# Patient Record
Sex: FEMALE | Race: OTHER | HISPANIC OR LATINO | URBAN - METROPOLITAN AREA
[De-identification: names, ages, dates, MRNs, and addresses within clinical notes are randomized per-mention and may not be internally consistent; named-entity substitution may affect disease eponyms.]

---

## 2018-04-12 ENCOUNTER — INPATIENT (INPATIENT)
Facility: HOSPITAL | Age: 48
LOS: 0 days | Discharge: HOME | DRG: 489 | End: 2018-04-13
Attending: ORTHOPAEDIC SURGERY | Admitting: ORTHOPAEDIC SURGERY
Payer: MEDICAID

## 2018-04-12 VITALS
HEART RATE: 70 BPM | WEIGHT: 149.91 LBS | TEMPERATURE: 98 F | OXYGEN SATURATION: 98 % | RESPIRATION RATE: 16 BRPM | DIASTOLIC BLOOD PRESSURE: 70 MMHG | SYSTOLIC BLOOD PRESSURE: 110 MMHG | HEIGHT: 61 IN

## 2018-04-12 DIAGNOSIS — S83.512A SPRAIN OF ANTERIOR CRUCIATE LIGAMENT OF LEFT KNEE, INITIAL ENCOUNTER: ICD-10-CM

## 2018-04-12 DIAGNOSIS — Z01.818 ENCOUNTER FOR OTHER PREPROCEDURAL EXAMINATION: ICD-10-CM

## 2018-04-12 DIAGNOSIS — Z98.890 OTHER SPECIFIED POSTPROCEDURAL STATES: Chronic | ICD-10-CM

## 2018-04-12 DIAGNOSIS — S83.519A SPRAIN OF ANTERIOR CRUCIATE LIGAMENT OF UNSPECIFIED KNEE, INITIAL ENCOUNTER: ICD-10-CM

## 2018-04-12 LAB — HCG UR QL: NEGATIVE — SIGNIFICANT CHANGE UP

## 2018-04-12 RX ORDER — LOSARTAN POTASSIUM 100 MG/1
1 TABLET, FILM COATED ORAL
Qty: 0 | Refills: 0 | COMMUNITY

## 2018-04-12 RX ORDER — ACETAMINOPHEN 500 MG
975 TABLET ORAL ONCE
Qty: 0 | Refills: 0 | Status: COMPLETED | OUTPATIENT
Start: 2018-04-12 | End: 2018-04-12

## 2018-04-12 RX ORDER — TRAMADOL HYDROCHLORIDE 50 MG/1
1 TABLET ORAL
Qty: 40 | Refills: 0 | OUTPATIENT
Start: 2018-04-12

## 2018-04-12 RX ORDER — HYDROMORPHONE HYDROCHLORIDE 2 MG/ML
0.5 INJECTION INTRAMUSCULAR; INTRAVENOUS; SUBCUTANEOUS ONCE
Qty: 0 | Refills: 0 | Status: DISCONTINUED | OUTPATIENT
Start: 2018-04-12 | End: 2018-04-12

## 2018-04-12 RX ORDER — SODIUM CHLORIDE 9 MG/ML
1000 INJECTION, SOLUTION INTRAVENOUS
Qty: 0 | Refills: 0 | Status: DISCONTINUED | OUTPATIENT
Start: 2018-04-12 | End: 2018-04-12

## 2018-04-12 RX ORDER — FENTANYL CITRATE 50 UG/ML
25 INJECTION INTRAVENOUS
Qty: 0 | Refills: 0 | Status: DISCONTINUED | OUTPATIENT
Start: 2018-04-12 | End: 2018-04-12

## 2018-04-12 RX ORDER — DIPHENHYDRAMINE HCL 50 MG
25 CAPSULE ORAL EVERY 6 HOURS
Qty: 0 | Refills: 0 | Status: DISCONTINUED | OUTPATIENT
Start: 2018-04-12 | End: 2018-04-13

## 2018-04-12 RX ORDER — SODIUM CHLORIDE 9 MG/ML
3 INJECTION INTRAMUSCULAR; INTRAVENOUS; SUBCUTANEOUS EVERY 8 HOURS
Qty: 0 | Refills: 0 | Status: DISCONTINUED | OUTPATIENT
Start: 2018-04-12 | End: 2018-04-13

## 2018-04-12 RX ORDER — CELECOXIB 200 MG/1
200 CAPSULE ORAL ONCE
Qty: 0 | Refills: 0 | Status: COMPLETED | OUTPATIENT
Start: 2018-04-12 | End: 2018-04-12

## 2018-04-12 RX ORDER — ACETAMINOPHEN 500 MG
1000 TABLET ORAL ONCE
Qty: 0 | Refills: 0 | Status: COMPLETED | OUTPATIENT
Start: 2018-04-12 | End: 2018-04-12

## 2018-04-12 RX ORDER — DIPHENHYDRAMINE HCL 50 MG
25 CAPSULE ORAL ONCE
Qty: 0 | Refills: 0 | Status: COMPLETED | OUTPATIENT
Start: 2018-04-12 | End: 2018-04-12

## 2018-04-12 RX ORDER — ONDANSETRON 8 MG/1
4 TABLET, FILM COATED ORAL EVERY 6 HOURS
Qty: 0 | Refills: 0 | Status: DISCONTINUED | OUTPATIENT
Start: 2018-04-12 | End: 2018-04-13

## 2018-04-12 RX ORDER — HYDROMORPHONE HYDROCHLORIDE 2 MG/ML
0.5 INJECTION INTRAMUSCULAR; INTRAVENOUS; SUBCUTANEOUS ONCE
Qty: 0 | Refills: 0 | Status: DISCONTINUED | OUTPATIENT
Start: 2018-04-12 | End: 2018-04-13

## 2018-04-12 RX ORDER — LOSARTAN POTASSIUM 100 MG/1
100 TABLET, FILM COATED ORAL DAILY
Qty: 0 | Refills: 0 | Status: DISCONTINUED | OUTPATIENT
Start: 2018-04-12 | End: 2018-04-13

## 2018-04-12 RX ORDER — KETOROLAC TROMETHAMINE 30 MG/ML
30 SYRINGE (ML) INJECTION ONCE
Qty: 0 | Refills: 0 | Status: DISCONTINUED | OUTPATIENT
Start: 2018-04-12 | End: 2018-04-12

## 2018-04-12 RX ORDER — ENOXAPARIN SODIUM 100 MG/ML
40 INJECTION SUBCUTANEOUS DAILY
Qty: 0 | Refills: 0 | Status: DISCONTINUED | OUTPATIENT
Start: 2018-04-13 | End: 2018-04-13

## 2018-04-12 RX ORDER — TRAMADOL HYDROCHLORIDE 50 MG/1
50 TABLET ORAL EVERY 4 HOURS
Qty: 0 | Refills: 0 | Status: DISCONTINUED | OUTPATIENT
Start: 2018-04-12 | End: 2018-04-13

## 2018-04-12 RX ADMIN — Medication 30 MILLIGRAM(S): at 17:30

## 2018-04-12 RX ADMIN — CELECOXIB 200 MILLIGRAM(S): 200 CAPSULE ORAL at 09:53

## 2018-04-12 RX ADMIN — Medication 400 MILLIGRAM(S): at 23:00

## 2018-04-12 RX ADMIN — FENTANYL CITRATE 25 MICROGRAM(S): 50 INJECTION INTRAVENOUS at 16:52

## 2018-04-12 RX ADMIN — Medication 30 MILLIGRAM(S): at 17:15

## 2018-04-12 RX ADMIN — HYDROMORPHONE HYDROCHLORIDE 0.5 MILLIGRAM(S): 2 INJECTION INTRAMUSCULAR; INTRAVENOUS; SUBCUTANEOUS at 19:07

## 2018-04-12 RX ADMIN — SODIUM CHLORIDE 135 MILLILITER(S): 9 INJECTION, SOLUTION INTRAVENOUS at 16:26

## 2018-04-12 RX ADMIN — TRAMADOL HYDROCHLORIDE 50 MILLIGRAM(S): 50 TABLET ORAL at 19:00

## 2018-04-12 RX ADMIN — Medication 1000 MILLIGRAM(S): at 23:20

## 2018-04-12 RX ADMIN — Medication 400 MILLIGRAM(S): at 17:16

## 2018-04-12 RX ADMIN — TRAMADOL HYDROCHLORIDE 50 MILLIGRAM(S): 50 TABLET ORAL at 18:32

## 2018-04-12 RX ADMIN — Medication 25 MILLIGRAM(S): at 17:04

## 2018-04-12 RX ADMIN — FENTANYL CITRATE 25 MICROGRAM(S): 50 INJECTION INTRAVENOUS at 16:24

## 2018-04-12 RX ADMIN — SODIUM CHLORIDE 3 MILLILITER(S): 9 INJECTION INTRAMUSCULAR; INTRAVENOUS; SUBCUTANEOUS at 21:36

## 2018-04-12 RX ADMIN — Medication 975 MILLIGRAM(S): at 09:56

## 2018-04-12 RX ADMIN — Medication 1000 MILLIGRAM(S): at 17:30

## 2018-04-12 RX ADMIN — SODIUM CHLORIDE 3 MILLILITER(S): 9 INJECTION INTRAMUSCULAR; INTRAVENOUS; SUBCUTANEOUS at 09:57

## 2018-04-12 RX ADMIN — HYDROMORPHONE HYDROCHLORIDE 0.5 MILLIGRAM(S): 2 INJECTION INTRAMUSCULAR; INTRAVENOUS; SUBCUTANEOUS at 19:05

## 2018-04-12 NOTE — H&P PST ADULT - HISTORY OF PRESENT ILLNESS
48 years old female presented with left knee pain, limited ROM, s/p injury in October 2017, pt has s/p left knee arthroscopy in 01/18, pt was diagnosed with sprain of anterior cruciate ligament of knee and is scheduled for left knee arthroscopy and anterior cruciate ligament repair on 4/12/18.

## 2018-04-12 NOTE — H&P PST ADULT - NEGATIVE ALLERGY TYPES
no reactions to animals/no outdoor environmental allergies/no indoor environmental allergies/no reactions to insect bites

## 2018-04-12 NOTE — H&P PST ADULT - NEGATIVE GASTROINTESTINAL SYMPTOMS
no vomiting/no diarrhea/no flatulence/hx of GERD/no change in bowel habits/no nausea/no constipation/no abdominal pain

## 2018-04-12 NOTE — H&P PST ADULT - MUSCULOSKELETAL
details… detailed exam left knee tenderness to palpation/no joint erythema/no joint warmth/no calf tenderness/diminished strength/decreased ROM due to pain/no joint swelling

## 2018-04-12 NOTE — H&P PST ADULT - NEGATIVE GENERAL GENITOURINARY SYMPTOMS
no urine discoloration/no dysuria/no flank pain R/no nocturia/normal urinary frequency/no incontinence/no urinary hesitancy/no flank pain L/no hematuria/no renal colic

## 2018-04-12 NOTE — H&P PST ADULT - FAMILY HISTORY
Mother  Still living? Yes, Estimated age: Age Unknown  Diabetes mellitus, type 2, Age at diagnosis: Age Unknown  Family history of hypertension, Age at diagnosis: Age Unknown  Family history of cardiac disorder, Age at diagnosis: Age Unknown

## 2018-04-12 NOTE — H&P PST ADULT - NEGATIVE CARDIOVASCULAR SYMPTOMS
no peripheral edema/no dyspnea on exertion/no orthopnea/no paroxysmal nocturnal dyspnea/no palpitations/no chest pain/no claudication

## 2018-04-12 NOTE — H&P PST ADULT - NSANTHOSAYNRD_GEN_A_CORE
No. MILENA screening performed.  STOP BANG Legend: 0-2 = LOW Risk; 3-4 = INTERMEDIATE Risk; 5-8 = HIGH Risk

## 2018-04-12 NOTE — H&P PST ADULT - NEGATIVE NEUROLOGICAL SYMPTOMS
no transient paralysis/no paresthesias/no syncope/no vertigo/no weakness/no focal seizures/no tremors/no loss of consciousness/no hemiparesis/no facial palsy/no confusion/no generalized seizures/no loss of sensation/no difficulty walking

## 2018-04-12 NOTE — ASU DISCHARGE PLAN (ADULT/PEDIATRIC). - MEDICATION SUMMARY - MEDICATIONS TO TAKE
I will START or STAY ON the medications listed below when I get home from the hospital:    traMADol 50 mg oral tablet  -- 1 tab(s) by mouth every 6 hours, As Needed  -- Indication: For pain    losartan 100 mg oral tablet  -- 1 tab(s) by mouth once a day  -- Indication: For Htn

## 2018-04-12 NOTE — H&P PST ADULT - PMH
Breast hypertrophy in female    GERD (gastroesophageal reflux disease)    Hypertension    Migraines    Sprain of anterior cruciate ligament of left knee, initial encounter

## 2018-04-12 NOTE — H&P PST ADULT - GASTROINTESTINAL DETAILS
soft/no bruit/no masses palpable/no guarding/normal/no rebound tenderness/no distention/nontender/bowel sounds normal

## 2018-04-12 NOTE — CHART NOTE - NSCHARTNOTEFT_GEN_A_CORE
Orthopedics:    Dx: S/p left knee ACL reconstruction POD#0 with pain     Called to evaluate pt for pain. Pt with severe pain of the left knee.   RN gave doses of tramadol, ofirmev, fentanyl, benadryl, IV tylenol with no relief.   Spoke with Dr Curtis who recommended IV Dilaudid for codeine allergy.   Reaction to codeine as per pt is Hives.   Pt denies Chest pain, SOB, dyspnea, paresthesias, N/V/D, abdominal pain, syncope, or pain anywhere else.     Vital Signs Last 24 Hrs  T(C): 36.4 (12 Apr 2018 15:51), Max: 36.9 (12 Apr 2018 08:37)  T(F): 97.6 (12 Apr 2018 15:51), Max: 98.4 (12 Apr 2018 08:37)  HR: 90 (12 Apr 2018 19:00) (70 - 99)  BP: 134/80 (12 Apr 2018 19:00) (110/70 - 134/80)  BP(mean): 95 (12 Apr 2018 19:00) (73 - 100)  RR: 20 (12 Apr 2018 19:00) (14 - 30)  SpO2: 97% (12 Apr 2018 19:00) (93% - 100%)    PE:  General: AAOx3. NAD.   Lt knee: Dressing c/d/i. Skin pink, warm. COmpartments soft, NVI SILT. 5/5 strength of ehl/ta/gs b/l.  Lower ext: No ct, calves soft, 2+pulses NVI.    AP: 49 y/o F s/p left knee acl reconstruction pod#0  - pain control  - dvt ppx  - DIlaudid iv 0.5mg once now  - iv tylenol  - case d/w dr curtis who recommended IV Dilaudid

## 2018-04-12 NOTE — ASU DISCHARGE PLAN (ADULT/PEDIATRIC). - NOTIFY
Pain not relieved by Medications/Bleeding that does not stop/Numbness, tingling/Persistent Nausea and Vomiting/Fever greater than 101/Swelling that continues/Numbness, color, or temperature change to extremity

## 2018-04-12 NOTE — H&P PST ADULT - RS GEN PE MLT RESP DETAILS PC
no rhonchi/no wheezes/good air movement/respirations non-labored/clear to auscultation bilaterally/airway patent/no rales/normal/breath sounds equal

## 2018-04-12 NOTE — H&P PST ADULT - PSH
History of bilateral breast reduction surgery  2002  S/P abdominoplasty  2013  S/P arthroscopy of left knee  01/2018

## 2018-04-13 VITALS
DIASTOLIC BLOOD PRESSURE: 73 MMHG | OXYGEN SATURATION: 97 % | RESPIRATION RATE: 17 BRPM | SYSTOLIC BLOOD PRESSURE: 121 MMHG | HEART RATE: 93 BPM | TEMPERATURE: 99 F

## 2018-04-13 DIAGNOSIS — G89.18 OTHER ACUTE POSTPROCEDURAL PAIN: ICD-10-CM

## 2018-04-13 DIAGNOSIS — Z98.890 OTHER SPECIFIED POSTPROCEDURAL STATES: ICD-10-CM

## 2018-04-13 PROCEDURE — 99223 1ST HOSP IP/OBS HIGH 75: CPT

## 2018-04-13 RX ORDER — KETOROLAC TROMETHAMINE 30 MG/ML
30 SYRINGE (ML) INJECTION ONCE
Qty: 0 | Refills: 0 | Status: DISCONTINUED | OUTPATIENT
Start: 2018-04-13 | End: 2018-04-13

## 2018-04-13 RX ORDER — TRAMADOL HYDROCHLORIDE 50 MG/1
100 TABLET ORAL EVERY 6 HOURS
Qty: 0 | Refills: 0 | Status: DISCONTINUED | OUTPATIENT
Start: 2018-04-13 | End: 2018-04-13

## 2018-04-13 RX ORDER — TRAMADOL HYDROCHLORIDE 50 MG/1
1 TABLET ORAL
Qty: 0 | Refills: 0 | COMMUNITY

## 2018-04-13 RX ORDER — TRAMADOL HYDROCHLORIDE 50 MG/1
2 TABLET ORAL
Qty: 40 | Refills: 0 | OUTPATIENT
Start: 2018-04-13 | End: 2018-04-17

## 2018-04-13 RX ADMIN — TRAMADOL HYDROCHLORIDE 50 MILLIGRAM(S): 50 TABLET ORAL at 07:56

## 2018-04-13 RX ADMIN — SODIUM CHLORIDE 3 MILLILITER(S): 9 INJECTION INTRAMUSCULAR; INTRAVENOUS; SUBCUTANEOUS at 05:00

## 2018-04-13 RX ADMIN — ENOXAPARIN SODIUM 40 MILLIGRAM(S): 100 INJECTION SUBCUTANEOUS at 11:17

## 2018-04-13 RX ADMIN — Medication 30 MILLIGRAM(S): at 10:14

## 2018-04-13 RX ADMIN — TRAMADOL HYDROCHLORIDE 50 MILLIGRAM(S): 50 TABLET ORAL at 08:15

## 2018-04-13 RX ADMIN — Medication 30 MILLIGRAM(S): at 10:35

## 2018-04-13 RX ADMIN — LOSARTAN POTASSIUM 100 MILLIGRAM(S): 100 TABLET, FILM COATED ORAL at 06:11

## 2018-04-13 NOTE — DISCHARGE NOTE ADULT - MEDICATION SUMMARY - MEDICATIONS TO CHANGE
I will SWITCH the dose or number of times a day I take the medications listed below when I get home from the hospital:    traMADol 50 mg oral tablet  -- 1 tab(s) by mouth every 6 hours, As Needed

## 2018-04-13 NOTE — CONSULT NOTE ADULT - SUBJECTIVE AND OBJECTIVE BOX
HPI:  48 years old female presented with left knee pain, limited ROM, s/p injury in October 2017, pt has s/p left knee arthroscopy in 01/18, pt was diagnosed with sprain of anterior cruciate ligament of knee and is scheduled for left knee arthroscopy and anterior cruciate ligament repair on 4/12/18. (12 Apr 2018 08:37)      4/13/18 - 48 year old female s/p left knee acl repair, pod#1.  Pt complaining of left knee pain which worsens on exertion.  No nausea or vomiting.  No chest pain or sob.  +allergy to codeine.  Pt received iv hydromorphone which caused pruritis.  Pt for possible dc home today.  Will trial increased dose of tramadol.      PAIN SCORE:  5/10       SCALE USED: (1-10 VNRS)      PAST MEDICAL & SURGICAL HISTORY:  Breast hypertrophy in female  Sprain of anterior cruciate ligament of left knee, initial encounter  GERD (gastroesophageal reflux disease)  Hypertension  Migraines  History of bilateral breast reduction surgery: 2002  S/P abdominoplasty: 2013  S/P arthroscopy of left knee: 01/2018      FAMILY HISTORY:  Family history of cardiac disorder  Family history of hypertension  Diabetes mellitus, type 2      SOCIAL HISTORY:  [ ] Denies Smoking, Alcohol, or Drug Use    Allergies    codeine (Hives; Rash)    Intolerances        PAIN MEDICATIONS:  ketorolac   Injectable 30 milliGRAM(s) IV Push once  ondansetron Injectable 4 milliGRAM(s) IV Push every 6 hours PRN  traMADol 100 milliGRAM(s) Oral every 6 hours PRN    Heme:  enoxaparin Injectable 40 milliGRAM(s) SubCutaneous daily    Antibiotics:    Cardiovascular:  losartan 100 milliGRAM(s) Oral daily    GI:    Endocrine:    All Other Medications:  sodium chloride 0.9% lock flush 3 milliLiter(s) IV Push every 8 hours          Vital Signs Last 24 Hrs  T(C): 36.7 (13 Apr 2018 04:56), Max: 37.1 (13 Apr 2018 00:20)  T(F): 98 (13 Apr 2018 04:56), Max: 98.7 (13 Apr 2018 00:20)  HR: 88 (13 Apr 2018 04:56) (74 - 99)  BP: 116/57 (13 Apr 2018 04:56) (110/61 - 134/80)  BP(mean): 92 (12 Apr 2018 19:25) (73 - 100)  RR: 18 (13 Apr 2018 04:56) (14 - 30)  SpO2: 97% (13 Apr 2018 04:56) (93% - 98%)                       LABS:                  RADIOLOGY:    Drug Screen:            [ ]  [place]  Reviewed and Copied to Chart

## 2018-04-13 NOTE — DISCHARGE NOTE ADULT - CARE PLAN
Principal Discharge DX:	S/P ACL repair  Goal:	Increase mobility and decrease pain  Assessment and plan of treatment:	Wound assessment

## 2018-04-13 NOTE — PHYSICAL THERAPY INITIAL EVALUATION ADULT - IMPAIRMENTS FOUND, PT EVAL
gross motor/joint integrity and mobility/ROM/gait, locomotion, and balance/muscle strength/post op/aerobic capacity/endurance

## 2018-04-13 NOTE — DISCHARGE NOTE ADULT - CARE PROVIDER_API CALL
Alex Garcia), Orthopaedic Surgery  [address] [place] [address]  Phone: [phone]  Fax: [phone]

## 2018-04-13 NOTE — DISCHARGE NOTE ADULT - PATIENT PORTAL LINK FT
You can access the [facility] Patient Portal, offered by [facility], by registering with the following website: [email or web address]

## 2018-04-13 NOTE — DISCHARGE NOTE ADULT - MEDICATION SUMMARY - MEDICATIONS TO TAKE
I will START or STAY ON the medications listed below when I get home from the hospital:    traMADol 50 mg oral tablet  -- 2 tab(s) by mouth every 6 hours, As needed, Severe Pain (7 - 10) MDD:8  -- Indication: For pain    losartan 100 mg oral tablet  -- 1 tab(s) by mouth once a day  -- Indication: For Htn

## 2018-04-13 NOTE — PROGRESS NOTE ADULT - SUBJECTIVE AND OBJECTIVE BOX
Ortho Note POD# 1  48yFemale    Diagnosis:  S/p Left ACL Reconstruction POD# 1    Patient is seen and evaluated at bedside; offers no acute complaints. Pain is mild; well controlled.      Vital Signs Last 24 Hrs  T(C): 36.7 (13 Apr 2018 04:56), Max: 37.1 (13 Apr 2018 00:20)  T(F): 98 (13 Apr 2018 04:56), Max: 98.7 (13 Apr 2018 00:20)  HR: 88 (13 Apr 2018 04:56) (70 - 99)  BP: 116/57 (13 Apr 2018 04:56) (110/61 - 134/80)  BP(mean): 92 (12 Apr 2018 19:25) (73 - 100)  RR: 18 (13 Apr 2018 04:56) (14 - 30)  SpO2: 97% (13 Apr 2018 04:56) (93% - 100%)  I&O's Detail    12 Apr 2018 07:01  -  13 Apr 2018 07:00  --------------------------------------------------------  IN:    Lactated Ringers IV Bolus: 1700 mL    lactated ringers.: 135 mL  Total IN: 1835 mL    OUT:    Voided: 600 mL  Total OUT: 600 mL    Total NET: 1235 mL        Physical Exam:    General: AAOx3, in NAD, resting comfortably in bed.    Left knee:  In nl. alignment. Dressing is C/D/I. Brace in place. Calves are soft, non-tender. 2+pulses. NVI.            Impression:  48yFemale S/p Left ACL Reconstruction POD# 1  Plan:  -  Continue pain management  -  DVT prophylaxis   -  Daily Physical Therapy:  WBAT on LLE with crutches  -  Discharge planning: Home today  -  Encouraged use of incentive spirometer  -  Case d/w Dr. Garcia

## 2018-05-23 PROCEDURE — C1889: CPT

## 2018-05-23 PROCEDURE — 81025 URINE PREGNANCY TEST: CPT

## 2018-05-23 PROCEDURE — 97161 PT EVAL LOW COMPLEX 20 MIN: CPT

## 2018-05-23 PROCEDURE — C1713: CPT

## 2020-08-17 NOTE — PHYSICAL THERAPY INITIAL EVALUATION ADULT - GENERAL OBSERVATIONS, REHAB EVAL
pt asu conversion, aox4, s/p acl repair yeimi brace in place, safe I crutch adl brp and ambulator wbat
- - -

## 2023-10-23 NOTE — PHYSICAL THERAPY INITIAL EVALUATION ADULT - PATIENT/FAMILY/SIGNIFICANT OTHER GOALS STATEMENT, PT EVAL
get my knee better Protopic Counseling: Patient may experience a mild burning sensation during topical application. Protopic is not approved in children less than 2 years of age. There have been case reports of hematologic and skin malignancies in patients using topical calcineurin inhibitors although causality is questionable.

## 2024-05-13 NOTE — H&P PST ADULT - NSANTHNECKRD_ENT_A_CORE
From: Kristian Brewer  To: Jaskaran Goode  Sent: 5/13/2024 9:40 AM CDT  Subject: RE: Feeling Excessive Sleepiness    Good morning, Dr. Foley,     I have been utilizing the CPAP therapy, and it is going well. However, I still feel quite tired during the day, even after a whole night's rest and good results on the CPAP data report. For instance, last night, I slept for 8.5 hours on the CPAP machine (with a 98/100 sleep score) and feel like I cannot stay away today. 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 I am looking for support to get good rest and wake up refreshed in the morning. Each morning, I feel like I haven't slept much at night, even when I have.     Tavo  
No

## 2025-04-19 NOTE — H&P PST ADULT - PROBLEM SELECTOR PLAN 1
Jamin Gamboa(Resident)
Patient is scheduled for left knee arthroscopy and anterior cruciate ligament repair on 4/12/18.Patient is at average risk for this surgery.